# Patient Record
Sex: MALE | Race: WHITE | URBAN - METROPOLITAN AREA
[De-identification: names, ages, dates, MRNs, and addresses within clinical notes are randomized per-mention and may not be internally consistent; named-entity substitution may affect disease eponyms.]

---

## 2021-03-11 LAB
ABS NEUT (OLG): 4.01 X10(3)/MCL (ref 2.1–9.2)
BASOPHILS # BLD AUTO: 0.07 X10(3)/MCL (ref 0–0.2)
BASOPHILS NFR BLD AUTO: 1 % (ref 0–0.9)
BUN SERPL-MCNC: 18.4 MG/DL (ref 8.4–25.7)
CALCIUM SERPL-MCNC: 9.7 MG/DL (ref 8.4–10.2)
CHLORIDE SERPL-SCNC: 104 MMOL/L (ref 98–107)
CO2 SERPL-SCNC: 28 MMOL/L (ref 22–29)
CREAT SERPL-MCNC: 1.09 MG/DL (ref 0.72–1.25)
CREAT/UREA NIT SERPL: 17
EOSINOPHIL # BLD AUTO: 0.01 X10(3)/MCL (ref 0–0.9)
EOSINOPHIL NFR BLD AUTO: 0.1 % (ref 0–6.5)
ERYTHROCYTE [DISTWIDTH] IN BLOOD BY AUTOMATED COUNT: 12.5 % (ref 11.5–17)
GLUCOSE SERPL-MCNC: 91 MG/DL (ref 74–100)
HCT VFR BLD AUTO: 42.4 % (ref 42–52)
HGB BLD-MCNC: 14.2 GM/DL (ref 14–18)
IMM GRANULOCYTES # BLD AUTO: 0.02 10*3/UL (ref 0–0.02)
IMM GRANULOCYTES NFR BLD AUTO: 0.3 % (ref 0–0.43)
LYMPHOCYTES # BLD AUTO: 2.48 X10(3)/MCL (ref 0.6–4.6)
LYMPHOCYTES NFR BLD AUTO: 34.3 % (ref 16.2–38.3)
MCH RBC QN AUTO: 30.2 PG (ref 27–31)
MCHC RBC AUTO-ENTMCNC: 33.5 GM/DL (ref 33–36)
MCV RBC AUTO: 90.2 FL (ref 80–94)
MONOCYTES # BLD AUTO: 0.65 X10(3)/MCL (ref 0.1–1.3)
MONOCYTES NFR BLD AUTO: 9 % (ref 4.7–11.3)
NEUTROPHILS # BLD AUTO: 4.01 X10(3)/MCL (ref 2.1–9.2)
NEUTROPHILS NFR BLD AUTO: 55.3 % (ref 49.1–73.4)
NRBC BLD AUTO-RTO: 0 % (ref 0–0.2)
PLATELET # BLD AUTO: 206 X10(3)/MCL (ref 130–400)
PMV BLD AUTO: 9.7 FL (ref 7.4–10.4)
POTASSIUM SERPL-SCNC: 4.2 MMOL/L (ref 3.5–5.1)
RBC # BLD AUTO: 4.7 X10(6)/MCL (ref 4.7–6.1)
SARS-COV-2 AG RESP QL IA.RAPID: NEGATIVE
SODIUM SERPL-SCNC: 142 MMOL/L (ref 136–145)
WBC # SPEC AUTO: 7.2 X10(3)/MCL (ref 4.5–11.5)

## 2021-03-12 ENCOUNTER — HISTORICAL (OUTPATIENT)
Dept: SURGERY | Facility: HOSPITAL | Age: 61
End: 2021-03-12

## 2022-04-11 ENCOUNTER — HISTORICAL (OUTPATIENT)
Dept: ADMINISTRATIVE | Facility: HOSPITAL | Age: 62
End: 2022-04-11

## 2022-04-28 VITALS
HEIGHT: 71 IN | SYSTOLIC BLOOD PRESSURE: 114 MMHG | WEIGHT: 222 LBS | DIASTOLIC BLOOD PRESSURE: 80 MMHG | BODY MASS INDEX: 31.08 KG/M2 | OXYGEN SATURATION: 97 %

## 2022-04-30 NOTE — OP NOTE
DATE OF SURGERY:    03/12/2021    SURGEON:  Raghavendra Clemente MD    PREOPERATIVE DIAGNOSIS:  Left 3rd metacarpal shaft fracture, displaced.    POSTOPERATIVE DIAGNOSIS:  Left 3rd metacarpal shaft fracture, displaced.    PROCEDURE:  Open reduction internal fixation, left 3rd metacarpal shaft fracture.    ANESTHESIA:  LMA.    IV FLUIDS:  See Anesthesia.    ESTIMATED BLOOD LOSS:  Less than 10 cc.    COUNTS:  Correct.    COMPLICATIONS:  None.    IMPLANTS:  One 5 Synthes plate.    DISPOSITION:  Stable to PACU.    INDICATIONS FOR PROCEDURE:  Mr. Pastor is a 60-year-old male with continued pain and loss of motion of his left hand.  The patient was noted to have increase in shortening, spiral displaced fracture of the 3rd metacarpal.  Risks, benefits, and alternatives were discussed with the patient and patient's family in detail.  All questions were answered.  Informed consent was obtained.    PROCEDURE IN DETAIL:  Patient was found in preoperative holding by Anesthesia and found fit for surgery.  He was taken to the operating room and placed on the operating table in supine position.  All bony prominences were well padded.  Time-out was called to identify correct patient, correct procedure, correct site, and all were in agreement.  Patient underwent LMA anesthesia without complications.  He was then prepped and draped in normal sterile fashion leaving the left upper extremity exposed for surgery.  A well-padded tourniquet was placed on the left upper arm.  Approximate tourniquet time was 30 minutes at 250.  He received preoperative antibiotics.  After exsanguination of the left upper extremity, the tourniquet was inflated.  Attention was turned to the dorsal aspect of the left hand, where a 6 cm incision was made over the metacarpal shaft fracture.  Soft tissue dissection was carried down careful not to damage the neurovascular and tendinous structures.  Next, the fracture was easily identified.  He did have a large  piece of muscle in between the spiral fracture.  The bone ends were not touching given its displacement.  Next, this was then debrided.  Next, the patient underwent anatomic reduction with multiple point-to-point clamps.  Two lag screws were then placed perpendicular to the fracture.  After this was done, a neutralization plate was placed over the dorsal aspect of the 3rd metacarpal, 3 screws were placed proximal and distal to the fracture.  After this was done, multiple views with the big C-arm found the fracture well reduced and the hardware in appropriate position.  Tourniquet was released, hemostasis was achieved.  Copious irrigation was used to wash the wound.  Subcutaneous tissue was closed with 3-0 Vicryl.  Skin was closed with 3-0 nylon suture in standard fashion.  Xeroform, 4 x 4's, soft tissue dressing, and a well-padded volar splint were placed on the left upper extremity.  He was awoken by Anesthesia and brought to PACU in stable condition.        ______________________________  MD RAJI Costa/ERVIN  DD:  03/15/2021  Time:  08:02AM  DT:  03/15/2021  Time:  08:28AM  Job #:  [id number]